# Patient Record
Sex: FEMALE | Race: WHITE | NOT HISPANIC OR LATINO | Employment: FULL TIME | URBAN - METROPOLITAN AREA
[De-identification: names, ages, dates, MRNs, and addresses within clinical notes are randomized per-mention and may not be internally consistent; named-entity substitution may affect disease eponyms.]

---

## 2023-08-23 DIAGNOSIS — M25.561 PAIN IN BOTH KNEES, UNSPECIFIED CHRONICITY: Primary | ICD-10-CM

## 2023-08-23 DIAGNOSIS — M25.562 PAIN IN BOTH KNEES, UNSPECIFIED CHRONICITY: Primary | ICD-10-CM

## 2023-08-29 ENCOUNTER — HOSPITAL ENCOUNTER (OUTPATIENT)
Dept: GENERAL RADIOLOGY | Facility: HOSPITAL | Age: 23
Discharge: HOME | End: 2023-08-29
Admitting: PHYSICIAN ASSISTANT
Payer: COMMERCIAL

## 2023-08-29 ENCOUNTER — OFFICE VISIT (OUTPATIENT)
Dept: ORTHOPEDIC SURGERY | Facility: CLINIC | Age: 23
End: 2023-08-29
Payer: COMMERCIAL

## 2023-08-29 VITALS — WEIGHT: 293 LBS

## 2023-08-29 DIAGNOSIS — M25.561 PAIN IN BOTH KNEES, UNSPECIFIED CHRONICITY: ICD-10-CM

## 2023-08-29 DIAGNOSIS — M22.42 PATELLA, CHONDROMALACIA, LEFT: ICD-10-CM

## 2023-08-29 DIAGNOSIS — M22.2X1 PATELLOFEMORAL PAIN SYNDROME OF BOTH KNEES: Primary | ICD-10-CM

## 2023-08-29 DIAGNOSIS — M25.562 PAIN IN BOTH KNEES, UNSPECIFIED CHRONICITY: ICD-10-CM

## 2023-08-29 DIAGNOSIS — M22.2X2 PATELLOFEMORAL PAIN SYNDROME OF BOTH KNEES: Primary | ICD-10-CM

## 2023-08-29 DIAGNOSIS — M22.41 PATELLA, CHONDROMALACIA, RIGHT: ICD-10-CM

## 2023-08-29 PROBLEM — M19.90 ARTHRITIS: Status: ACTIVE | Noted: 2023-08-29

## 2023-08-29 PROCEDURE — 73560 X-RAY EXAM OF KNEE 1 OR 2: CPT

## 2023-08-29 PROCEDURE — 99204 OFFICE O/P NEW MOD 45 MIN: CPT | Performed by: PHYSICIAN ASSISTANT

## 2023-08-29 NOTE — PROGRESS NOTES
Chief Complaint  Establish Care of the Right Knee and Establish Care of the Left Knee    Subjective    History of Present Illness      Becky Jackson is a 22 y.o. female who presents to [facility] for complaint of Knee Pain:   Patient complains of bilateral knee pain.   The pain began 2 years ago.   The pain is located over global aspect.    Symptoms improve with rest, avoiding painful activities, over-the-counter medications have alleviated her pain in the past.  The symptoms are worse with sitting for prolonged periods of time, rain.      She is accompanied by her . The patient states her bilateral knee pain onset after sustaining an injury, which involved twisting her knee and falling, in 2021. She has been following with Dr. Khan since age 15 years old. She states she is currently on work restrictions, and she returns for follow-up every 3 months as required by her employer. Her work restrictions reportedly include no bending, no stooping, no twisting, no ascending steps, and no ascending ladders.     Currently, the patient reports a history of arthritis deep to her patella, which is exacerbated by rain. She states she was informed by Dr. Khan she would need to continue on work restrictions until she obtains a new job, as she works on concrete [employer]. She notes she is no longer taking meloxicam because it did not provide relief. The patient denies receiving injections in the past, and she was advised injections would not be beneficial given that her knees are symptomatic bilaterally and surgery would not be indicated until she was approximately 50 or 60 years old.     The patient works 8 to 10 hour shifts at [employer].       Objective   Vital Signs:   Wt (!) 140 kg (308 lb 6.4 oz)       Physical Exam  Vitals signs and nursing note reviewed.   Constitutional:       Appearance: Normal appearance.   Pulmonary:      Effort: Pulmonary effort is normal.   Skin:     General:  Skin is warm and dry.      Capillary Refill: Capillary refill takes less than 2 seconds.   Neurological:      General: No focal deficit present.      Mental Status: She is alert and oriented to person, place, and time. Mental status is at baseline.   Psychiatric:         Mood and Affect: Mood normal.         Behavior: Behavior normal.         Thought Content: Thought content normal.         Judgment: Judgment normal.     Ortho Exam   BILATERAL knee  Positive patellar grind test with pain in the retropatellar region.    Positive for high Q-angle with patella tracking laterally in high grades of flexion.   Skin and soft tissues are swollen, consistent with inflammatory changes of the patellofemoral joint.    Positive for tenderness over the medial patellofemoral ligaments.   Quadriceps mechanism is well preserved.   Range of motion-Extension to Flexion: 0-105 degrees (LEFT), 0-115 (RIGHT).  Mild positive apprehension sign.  Negative anterior and posterior drawer. No medial or lateral instability noted.   Dorsalis pedis and posterior tibial artery pulses are palpable.   Common peroneal nerve function is well preserved.      Result Review :   Radiologic studies - see below for interpretation  BILATERAL knee xrays  weightbearing/standing 2 views each were ordered by Vicente Wen PA-C. Performed at [facility] on 08/29/2023. Images were independently viewed and interpreted by myself, my impression as follows:  Findings: Left knee: Mild-appearing degenerative changes of medial, lateral, and patellofemoral compartments.   Right knee: Mild tricompartmental osteoarthritis.  Bony lesion: no  Soft tissues: within normal limits  Joint spaces: within normal limits  Hardware appropriately positioned: not applicable  Prior studies available for comparison: yes, MRI scan of right knee obtained 03/18/2021.      Reviewed MRI report of right knee without contrast, performed at  [facility] on 3/28/21,  summary of impression below:   Motion degradation noted with repeat sequences tolerated due to claustrophobia  Mild/moderate effusion is present, no popliteal cyst  Patellar height is normal, trochlea is slightly shallow, moderate lateral patellar subluxation.  TT-TG distance is increased at 23 mm  Patellofemoral chondromalacia or osteochondral injury with cartilage edema and/or fissure and underlying marrow edema involves the median ridge/lateral facet region  Medial retinaculum/MPFL appear attenuated without edema  Peripheral osteochondral lesion/injury of the lateral femoral condyle with loss of definition of articular cartilage and subchondral bone plate cortex measuring 11 mm transverse by 8 mm AP  Intact articular cartilage of medial compartment  Prominent red marrow pattern is nonspecific, no focal marrow lesion, fracture, or definite loose body  Coronal images raise the possibility of a 9 mm transverse loose body interposed between the lateral femoral condyle and posterior lateral capsule          PROCEDURE  Procedures           Assessment   Assessment and Plan    Diagnoses and all orders for this visit:    1. Patellofemoral pain syndrome of both knees (Primary)    2. Patella, chondromalacia, left    3. Patella, chondromalacia, right             PLAN   Discussion of any imaging in detail. Discussion of orthopaedic goals.  Risk, benefits, and merits of treatment alternatives reviewed with the patient. Treatment alternatives include: oral anti-inflammatories/NSAID, intra-articular visco supplementation, physical therapy, and referral to cartilage restoration orthopedic surgeon, such as Ankur Rogers MD. At this point she would like to continue what she is currently doing because she does not have time or money to take off to see another specialist. She would like to continue her current work restrictions. We also discussed the importance of weight loss and the effects it has on the knees and potential  surgeries.    Ice, heat, and/or modalities as beneficial  Work status form completed and provided to patient  Patient is encouraged to call or return for any issues or concerns.  Follow up in 6 months  Patient was given instructions and counseling regarding her condition or for health maintenance advice. Please see specific information pulled into the AVS if appropriate.     Vicente Wen PA-C   Date of Encounter: 8/29/2023     Transcribed from ambient dictation for Vicente Wen PA-C by Ann Menezes.  08/29/23   14:15 EDT    Patient or patient representative verbalized consent to the visit recording.  I have personally performed the services described in this document as transcribed by the above individual, and it is both accurate and complete.  Vicente Wen PA-C  8/29/2023  14:28 EDT

## 2023-09-01 ENCOUNTER — PATIENT ROUNDING (BHMG ONLY) (OUTPATIENT)
Dept: ORTHOPEDIC SURGERY | Facility: CLINIC | Age: 23
End: 2023-09-01
Payer: COMMERCIAL

## 2023-09-01 NOTE — PROGRESS NOTES
September 1, 2023        A Welcome Card has been sent to the patient for PATIENT ROUNDING with [facility]

## 2023-09-05 ENCOUNTER — TELEPHONE (OUTPATIENT)
Dept: ORTHOPEDIC SURGERY | Facility: CLINIC | Age: 23
End: 2023-09-05
Payer: COMMERCIAL

## 2023-09-05 NOTE — TELEPHONE ENCOUNTER
Waiting for fax number. Letter is under media. Sending to both pools just so everyone can see when the fax number is received, so we can get Vicente to sign and back fax back.

## 2023-09-05 NOTE — TELEPHONE ENCOUNTER
Caller: Becky Jackson    Relationship: Self    Best call back number: [phone]    What form or medical record are you requesting: WORK NOTE THAT WAS FAXED FROM EMPLOYER THAT NEEDS TO BE SIGNED    Who is requesting this form or medical record from you: JOANNE     How would you like to receive the form or medical records (pick-up, mail, fax): FAX  If fax, what is the fax number: PATIENT WILL CALL BACK WITH FAX #    Timeframe paperwork needed: ASAP

## 2023-09-05 NOTE — TELEPHONE ENCOUNTER
PATIENT PAID FOR FORM OVER THE PHONE AND WOULD LIKE A CALL TO  WHEN IT IS COMPLETED.    [phone]

## 2023-09-12 NOTE — TELEPHONE ENCOUNTER
OKAY FOR HUB TO RELAY:      I HAVE CALLED AND LEFT SEVERAL MESSAGES REGARDING THIS NOTE. I AM NEEDING CLARIFICATION. DOES THE PATIENT WANT BOTH THE [facility] PAPERWORK AND THE WORK NOTE BOTH FAXED TO THIS NUMBER OR JUST THE WORK NOTE.   I HAVE HAD THE [facility] PAPERWORK UP FRONT FOR THE PATIENT TO  SINCE LAST WEEK AND IT HAS NOT BEEN PICKED UP YET. ORIGINALLY THE PATIENT WAS TO  THE [facility] PAPERWORK/[facility]J